# Patient Record
Sex: FEMALE | Race: OTHER | HISPANIC OR LATINO | Employment: UNEMPLOYED | URBAN - METROPOLITAN AREA
[De-identification: names, ages, dates, MRNs, and addresses within clinical notes are randomized per-mention and may not be internally consistent; named-entity substitution may affect disease eponyms.]

---

## 2018-11-26 ENCOUNTER — HOSPITAL ENCOUNTER (EMERGENCY)
Facility: HOSPITAL | Age: 38
Discharge: HOME | End: 2018-11-26
Attending: EMERGENCY MEDICINE | Admitting: EMERGENCY MEDICINE
Payer: COMMERCIAL

## 2018-11-26 VITALS
DIASTOLIC BLOOD PRESSURE: 82 MMHG | WEIGHT: 197 LBS | TEMPERATURE: 98.3 F | HEART RATE: 118 BPM | RESPIRATION RATE: 18 BRPM | SYSTOLIC BLOOD PRESSURE: 133 MMHG | OXYGEN SATURATION: 100 %

## 2018-11-26 DIAGNOSIS — T78.40XA ALLERGIC REACTION: Primary | ICD-10-CM

## 2018-11-26 PROCEDURE — 96375 TX/PRO/DX INJ NEW DRUG ADDON: CPT

## 2018-11-26 PROCEDURE — 96374 THER/PROPH/DIAG INJ IV PUSH: CPT

## 2018-11-26 PROCEDURE — 99283 EMERGENCY DEPT VISIT LOW MDM: CPT

## 2018-11-26 RX ORDER — RANITIDINE 150 MG/1
150 CAPSULE ORAL DAILY
Qty: 30 CAPSULE | Refills: 0 | Status: SHIPPED | OUTPATIENT
Start: 2018-11-26

## 2018-11-26 RX ORDER — FEXOFENADINE HYDROCHLORIDE 60 MG/1
60 TABLET, FILM COATED ORAL 2 TIMES DAILY
Qty: 20 TABLET | Refills: 0 | Status: SHIPPED | OUTPATIENT
Start: 2018-11-26

## 2018-11-26 RX ORDER — DIPHENHYDRAMINE HYDROCHLORIDE 50 MG/ML
50 INJECTION INTRAMUSCULAR; INTRAVENOUS ONCE
Status: COMPLETED | OUTPATIENT
Start: 2018-11-26 | End: 2018-11-26

## 2018-11-26 RX ORDER — METHYLPREDNISOLONE SODIUM SUCCINATE 125 MG/2ML
125 INJECTION, POWDER, LYOPHILIZED, FOR SOLUTION INTRAMUSCULAR; INTRAVENOUS ONCE
Status: COMPLETED | OUTPATIENT
Start: 2018-11-26 | End: 2018-11-26

## 2018-11-26 RX ORDER — METHYLPREDNISOLONE 4 MG/1
TABLET ORAL
Qty: 21 TABLET | Refills: 0 | Status: SHIPPED | OUTPATIENT
Start: 2018-11-26

## 2018-11-26 RX ADMIN — FAMOTIDINE 20 MG: 10 INJECTION, SOLUTION INTRAVENOUS at 14:26

## 2018-11-26 RX ADMIN — METHYLPREDNISOLONE SODIUM SUCCINATE 125 MG: 125 INJECTION, POWDER, FOR SOLUTION INTRAMUSCULAR; INTRAVENOUS at 14:26

## 2018-11-26 RX ADMIN — DIPHENHYDRAMINE HYDROCHLORIDE 50 MG: 50 INJECTION, SOLUTION INTRAMUSCULAR; INTRAVENOUS at 14:26

## 2018-11-26 NOTE — DISCHARGE INSTRUCTIONS
Reacción alérgica general   LO QUE NECESITA SABER:   Fernanda reacción alérgica es la respuesta de kapoor cuerpo a un alérgeno  Los alérgenos [employer], alimentos, picaduras de insectos, la caspa de los [place], el moho, el látex, los químicos y los ácaros del polvo  El polen de los [place], [place] césped y las hierbas malas también pueden causar fernanda reacción alérgica  INSTRUCCIONES SOBRE EL [facility]ITAL[facility]:   Regrese a la mitch de emergencias si:   · Usted tiene un sarpullido, urticaria, inflamación o comezón en la piel que empeora  · Usted tiene dificultad para respirar, falta de aire, sibilancia o tos  · Kapoor garganta se siente apretada o venkat labios o lengua se inflaman  · Usted tiene dificultad para tragar o hablar  · Usted tiene mareos, sensación de aturdimiento, desmayos o confusión  · Usted tiene náusea, vómito, diarrea o calambres abdominales  · Usted tiene dolor o presión en el pecho  Pregúntele a kapoor [place] vitaminas y minerales son adecuados para usted  · Usted tiene preguntas o inquietudes acerca de kapoor condición o cuidado  Medicamentos:   · Medicamentos,  para aliviar ciertos síntomas de las [place], drew la comezón, los estornudos y la inflamación  Usted los puede vahid en forma de píldora o de gotas en venkat ojos o nariz  Los tratamientos tópicos pueden darse para aplicarse directamente en kapoor piel para ayudar a disminuir la comezón o la inflamación  · [place] venkat medicamentos drew se le haya indicado  Consulte con kapoor médico si usted keturah que kapoor medicamento no le está ayudando o si presenta efectos secundarios  Infórmele si es alérgico a algún medicamento  Mantenga fernanda lista actualizada de los [place], las vitaminas y los productos herbales que lynsey  Incluya los siguientes datos de los medicamentos: cantidad, frecuencia y motivo de administración  Traiga con usted la lista o los envases de la píldoras a venkat citas de seguimiento   Lleve la lista de los medicamentos con usted en jennifer de fernanda emergencia  Acuda a venkat consultas de control con quintero médico según le indicaron  Anote venkat preguntas para que se acuerde de hacerlas carlos venkat visitas  Cuidados personales:   · Evite el alérgeno  que usted keturah haya causado quintero reacción alérgica  · Use compresas frías  en la piel o los ojos si fueron afectados por la reacción alérgica  Es probable que las compresas frías ayuden a aliviar quintero piel u ojos  · Enjuague venkat pasajes nasales  con fernanda solución salina  El enjuague diario puede llegar a ayudar a mantener limpia quintero nariz de alérgenos  · No fume  Los síntomas de venkat alergias pueden llegar a disminuir si usted no se expone al humo del cigarrillo  La nicotina y otros químicos de los cigarrillos y cigarros también pueden provocar daño en los pulmones  Pida información a quintero médico si usted actualmente fuma y necesita ayuda para dejar de fumar  Los cigarrillos electrónicos o tabaco sin humo todavía contienen nicotina  Consulte con quintero médico antes de [employer]  © 2017 [address]  Information is for End User's use only and may not be sold, redistributed or otherwise used for commercial purposes  All illustrations and images included in CareNotes® are the copyrighted property of A D A M , Inc  or Jorge Barrera  Esta información es sólo para uso en educación  Quintero intención no es darle un consejo médico sobre enfermedades o tratamientos  Colsulte con quintero Joiner Mukherjee farmacéutico antes de seguir cualquier régimen médico para saber si es seguro y efectivo para usted  Metilprednisolona (Por la boca)   Sirve para tratar muchas enfermedades y condiciones, incluyendo problemas relacionadas con la inflamación  Asimta medicamento es un corticosteroide  Anita(s) : Medrol, Medrol Dosepak   Existen muchas otras marcas de [facility] [employer]    Asmita medicamento no debe ser usado cuando:   Asmita medicamento no es adecuado para todas las personas  No lo use si ha tenido fernanda reacción alérgica a la metilprednisolona o si actualmente tiene fernanda infección por hongos  Forma de usar adebayo medicamento:   Tableta  · [place] venkat medicamentos drew se le haya indicado  Es probable que sea necesario cambiar kapoor dosis varias veces hasta encontrar la que funciona mejor para usted  · Es posible que tengan que cambiarle kapoor dosis ocasionalmente si está usando adebayo medicamento para enfermedades existentes  Algunas personas vicki adebayo medicamento un día por medio, lo que ayuda a [employer] secundarios  · [place] kapoor medicamento por la mañana a menos que el médico le indique lo contrario  · Lo más conveniente es vahid adebayo medicamento con comida o con [place]  · Si olvida fernanda dosis: Si olvida fernanda dosis de kapoor medicamento, tómelo lo más pronto posible  Si es shannon la hora para kapoor próxima dosis, espere hasta entonces para vahid kapoor dosis regular  No use medicamento adicional para reponer la dosis olvidada  · Guarde el medicamento en un recipiente cerrado a temperatura ambiente y alejado del calor, la humedad y la christianne directa  Medicamentos y [employer] que debe evitar:   Consulte con kapoor médico o farmacéutico antes de usar cualquier medicamento, incluyendo los que compra sin receta médica, las vitaminas y los productos herbales  · Algunos alimentos y medicamentos pueden afectar la eficacia de metilprednisolona  Informe a kapoor médico si usted [place] alguno de los siguientes medicamentos:  ¨ Ciclosporina  ¨ Lor, fenitoína  ¨ Rifampina  ¨ Ketoconazol  ¨ Aspirina, especialmente dosis altas  ¨ Un anticoagulante, drew la warfarina  ¨ Medicamento para la diabetes  · Adebayo medicamento podría interferir con las vacunas  Consulte a kapoor médico antes de recibir [employer] contra la gripe u otras vacunas    Precauciones carlos el uso de adebayo medicamento:   · Infórmele a kapoor médico si usted está embarazada o dando de lactar, o si tiene problemas renales, enfermedad hepática, insuficiencia cardíaca, hipertensión, osteoporosis, problemas de coagulación, o problemas de tiroides  También informe a kapoor médico si usted ha tenido [address] o emocionales (drew la depresión) o problemas estomacales o intestinales (drew Jaxon Ophelia o diverticulitis)  · Asmita medicamento puede provocarle los siguientes problemas:  ¨ Cambios de [place] o del [place] de ánimo  ¨ Presión arterial aumentada, retención de agua, cambio en los niveles de sal o potasio  ¨ Cataratas o glaucoma (con uso a endy plazo)  ¨ Crecimiento lento en niños (con el uso a endy plazo)  · No suspenda el uso de asmita medicamento súbitamente  Kapoor médico necesitará disminuir kapoor dosis poco a poco antes de suspender el medicamento por completo  · Asmita medicamento podría causar que usted contraiga infecciones con mayor facilidad  Infórmele a kapoor médico inmediatamente si usted tiene fernanda infección o si se expone a la varicela, sarampión, u otras infecciones serias  Informe a kapoor médico si anteriormente usted foster sufrido fernanda infección grave, drew tuberculosis, herpes, o estrongiloides (oxiurosis)  · Informe a kapoor médico acerca de cualquier estrés adicional, o lo que le provoca ansiedad en kapoor anita  Podría ser necesario cambiar kapoor dosis por un corto periodo de Sandi  · Dígale a todo médico o dentista encargado de atenderle que usted está usando Eugenia  · Guarde todos los medicamentos fuera del alcance de los niños  Nunca comparta venkat medicamentos con [employer]    Efectos secundarios que pueden presentarse carlos el uso de asmita medicamento:   Consulte inmediatamente con el médico si nota cualquiera de estos efectos secundarios:  · Reacción alérgica: Comezón o ronchas, hinchazón del kirk o las julita, hinchazón u hormigueo en la boca o garganta, opresión en el pecho, dificultad para respirar  · Pecas oscuras, cambio del color de la piel, frialdad, debilidad, cansancio, náusea, vómito, pérdida del peso corporal  · Depresión, pensamientos, sentimientos o comportamientos inusuales, dificultad para dormir  · Jose, escalofríos, tos, dolor de garganta, o el cuerpo adolorido  · Dolor intenso de [place], náuseas, vómitos o deposiciones de color carnes o tasha  · Cambios o crecimientos en la piel  · Inflamación en venkat julita, tobillos, o pies, aumento de peso rápido  · Problemas en la vista, dolor en los ojos, dolor de benja  Consulte con el médico si nota los siguientes efectos secundarios menos graves:   · Aumento del apetito  · [place] redonda, inflamada  · Aumento de peso alrededor de quintero deysi, parte superior de la espalda, pecho, malik o cintura  Consulte con el médico si nota otros efectos secundarios que keturah son causados por adebayo medicamento  Llame a quintero médico para consultarle Marylu Marti puede notificar venkat efectos secundarios al FDA al [id number]  © 2017 [address] Information is for End User's use only and may not be sold, redistributed or otherwise used for commercial purposes  Esta información es sólo para uso en educación  Quintero intención no es darle un consejo médico sobre enfermedades o tratamientos  Colsulte con quintero [place] farmacéutico antes de seguir cualquier régimen médico para saber si es seguro y efectivo para usted  Fexofenadina (Por la boca)   Trata los síntomas de la fiebre del heno y la urticaria crónica y la comezón  Anita(s) : Gertrude Ramirez Allergy, Allegra ODT, Allergy 24Hr, Children's Allegra Allergy, Children's Allergy Fexofenadine HCl, [facility] Allergy Relief, [facility] Fexofenadine HCl, Leader Aller-Ease, [facility] Allergy Relief, Sunmark Fexofenadine Hydrochloride, TopCare 24-Hour Fexofenadine Hydrochloride, WAL-FEX ALLERGY, Wal-Fex   Existen muchas otras marcas de [place]  Adebayo medicamento no debe ser usado cuando:   Adebayo medicamento no es adecuado para todas las personas   No lo use si ha tenido fernanda reacción alérgica a la fexofenadina o a la terfenadina  Forma de usar adebayo medicamento:   Addi Maher, Tableta para disolver, Tableta de liberación prolongada  · Quintero médico le indicara cuanto medicamento necesita usar  No use más medicamento de lo indicado  · Es mejor vahid la tableta  con agua  · Agite jorge alberto la solución oral  antes de usarla  Mida el líquido oral con [place], [place] para uso oral o taza especialmente marcadas para medir medicamentos  · [place] la tableta de desintegración oral con el estómago vacío  · Si usted está usando la tableta desintegrable, asegúrese de e secar venkat julita antes de tocar la tableta  Despegue hacia atrás el aluminio del paquete y saque la tableta  No empuje la tableta a través del aluminio  Coloque la tableta en quintero boca  [place]annon pronto la tableta se haya derretido, tome un poco de agua  No mastique ni rompa la tableta  · Si olvida fernanda dosis: Si olvida fernanda dosis de quintero medicamento, tómelo lo más pronto posible  Si es shannon la hora para quintero próxima dosis, espere hasta entonces para vahid quintero dosis regular  No use medicamento adicional para reponer la dosis olvidada  · Guarde el medicamento en un recipiente cerrado a temperatura ambiente y alejado del calor, la humedad y la christianne directa  Medicamentos y [employer] que debe evitar:   Consulte con quintero médico o farmacéutico antes de usar cualquier medicamento, incluyendo los que compra sin receta médica, las vitaminas y los productos herbales  · Algunos alimentos y [place] pueden afectar al funcionamiento de fexofenadina  Informe a u médico si usted [place] alguno de los siguientes medicamentos:   ¨ Eritromicina  ¨ Ketoconazol  · Si [place] un antiácido que contiene aluminio o magnesio, tómelo por lo menos 15 minutos antes o después de vahid fexofenadina  · No coma toronja ni tome jugos de [place], toronja o [address]    Precauciones carlos el uso de adebayo medicamento:   · Informe a quintero médico si usted está embarazada o amamantando o si padece de enfermedad en los riñones o fernanda enfermedad llamada fenilcetonuria (PKU)  · Asmita medicamento podría causarle a usted mareos o somnolencia  No maneje ni juan nada que pueda ser peligroso hasta que usted sepa drew le afecta asmita medicamento  · Llame a kapoor médico si venkat síntomas no mejoran, o si Earma Galea  · Guarde todos los medicamentos fuera del alcance de los niños  Nunca comparta venkat medicamentos con [employer]  Efectos secundarios que pueden presentarse carlos el uso de asmita medicamento:   Consulte inmediatamente con el médico si nota cualquiera de estos efectos secundarios:  · Reacción alérgica: Comezón o ronchas, hinchazón del kirk o las julita, hinchazón u hormigueo en la boca o garganta, opresión en el pecho, dificultad para respirar  Consulte con el médico si nota otros efectos secundarios que keturah son causados por asmita medicamento  Llame a kapoor médico para consultarle [place]  le puede notificar venkat efectos secundarios al FDA al [id number]  © 2017 [address]  Information is for End User's use only and may not be sold, redistributed or otherwise used for commercial purposes  Esta información es sólo para uso en educación  Kapoor intención no es darle un consejo médico sobre enfermedades o tratamientos  Colsulte con kapoor Parveen Keas farmacéutico antes de seguir cualquier régimen médico para saber si es seguro y efectivo para usted        Ranitidina (Por la boca)   Humphrey Sebastian y previene la acidez estomacal  También se [place] para tratar las úlceras estomacales, la enfermedad por reflujo gastroesofágico y las condiciones que causan demasiada producción de ácido estomacal    Anita(s) : DermaSilkRx Anodynexa [place], DermacinRx Inflammatral [place], [facility] Acid Control 150, [facility] HypeSpark, Good Sense Acid Reducer, Leader Acid Control, Leader raNITIdine HCl, [facility] Aid Acid Reducer, Sunmark Acid Reducer, TopCare Heartburn Relief 150, TopCare Heartburn Relief 75, Zantac, Zantac 150, Zantac 300, Zantac 75   Existen muchas otras marcas de Eugenia  Asmita medicamento no debe ser usado cuando:   Asmita medicamento no es adecuado para todas las personas  No lo use si ha tenido fernanda reacción alérgica a la ranitidina  [place] de usar asmita medicamento:   [facility] Erich farrar  · Kapoor médico le indicara cuanto medicamento necesita usar  No use más medicamento de lo indicado  · [address] medicamento si usted está usando asmita medicamento sin prescripción médica  · Mida el líquido oral con [place], [place] para uso oral o taza especialmente marcadas para medir medicamentos  · La tableta efervescente  no debe ser [place], tomada entera ni disuelta sobre kapoor lengua  La tableta Zantac 25 EFFERdose®  debe ser [employer] en cucharita (o más) de agua  No tome el líquido hasta que la tableta se haya disuelto completamente  · Si está usando asmita medicamento para prevenir las agruras, tómelo de 30 a 60 minutos antes de que usted coma o tome algún alimento o bebida que le cause las agruras  · Si olvida fernanda dosis: Si olvida fernanda dosis de kapoor medicamento, tómelo lo más pronto posible  Si es shannon la hora para kapoor próxima dosis, espere hasta entonces para vahid kapoor dosis regular  No use medicamento adicional para reponer la dosis olvidada  · Guarde el medicamento en un recipiente cerrado a temperatura ambiente y alejado del calor, la humedad y la christianne directa  Usted puede guardar la solución oral  en el refrigerador  Medicamentos y [employer] que debe evitar:   Consulte con kapoor médico o farmacéutico antes de usar cualquier medicamento, incluyendo los que compra sin receta médica, las vitaminas y los productos herbales  · Algunos medicamentos podrían afectar la función de la ranitidina   Infórmele a kapoor médico si usted [employer] los siguientes:   ¨ Atazanavir  ¨ Delavirdina  ¨ Gefitinib  ¨ Glipizida  ¨ Ketoconazol  ¨ Midazolam  ¨ Triazolam  ¨ Warfarina  Precauciones carlos el uso de asmita medicamento:   · Informe a kapoor médico si usted está embarazada o dando de lactar (amamantando), o si usted tiene enfermedad en los riñones o en el hígado, o si tiene antecedentes de [place]  · Asmita medicamento de van EFFERdose® en forma de tabletas, contiene fenilalanina  Si usted sufre de fenilcetonuria, hable con kapoor médico antes de que usted use AMX  · Dígale a todo médico o dentista encargado de atenderle que usted está usando AMX  Puede que asmita medicamento afecte algunos resultados de [email or web address]  · Guarde todos los medicamentos fuera del alcance de los niños  Nunca comparta venkat medicamentos con [employer]  Efectos secundarios que pueden presentarse carlos el uso de asmita medicamento:   Consulte inmediatamente con el médico si nota cualquiera de estos efectos secundarios:  · Reacción alérgica: Comezón o ronchas, hinchazón del kirk o las julita, hinchazón u hormigueo en la boca o garganta, opresión en el pecho, dificultad para respirar  · Ampollas, despellejamiento, sarpullido carnes en la piel  · Orina oscura o heces pálidas, náuseas, vómitos, falta de apetito, dolor estomacal, coloración amarillenta en la piel u ojos  · Ritmo cardíaco acelerado, lento o irregular  · Sangrados, moretones o debilidad inusuales  Consulte con el médico si nota los siguientes efectos secundarios menos graves:   · Estreñimiento o diarrea  · Dolor de benja  · Náuseas, vómitos o dolor de estómago leves  Consulte con el médico si nota otros efectos secundarios que keturah son causados por asmita medicamento  Llame a kapoor médico para consultarle Marylu Marti puede notificar venkat efectos secundarios al FDA al [id number]    © 2017 [address] Information is for End User's use only and may not be sold, redistributed or otherwise used for commercial purposes  Esta información es sólo para uso en educación  Kapoor intención no es darle un consejo médico sobre enfermedades o tratamientos  Colsulte con kapoor Pinky Antis farmacéutico antes de seguir cualquier régimen médico para saber si es seguro y efectivo para usted

## 2018-11-26 NOTE — ED PROVIDER NOTES
History  Chief Complaint   Patient presents with    Allergic Reaction     Pt Moldovan speaking for; to (do) [facility] #880288 used for translation  Pt reports generalized itching that started yesterday  Pt denies introduction of new foods, medication or personal care items  Pt reports " I think its from the white dust in my apartment " Pt denies difficultly swallowing or trouble breathing  Here with apparent allergic reaction  Started w/ itchy rash to head/neck a couple of days ago and has slowly continued down the whole body  Rash primarily around neck, axilla, under breasts, waist, groin and lower legs  No new soaps, lotions, detergents, no new foods, no otc/herbals meds, no new pets, furniture, carpeting  No recent illness, no f/c/s, no cough/congestion  Denies sob/sandoval, no n/v/d  C/o red, itchy rash, not painful, no drainage     concerned about an exposure to something in the apartment  Per pt and , apartment in very poor condition  Has roaches, rats and other pests they they use glue traps, but no powders, sprays or foggers   concerned about a white powdery substance that falls off the ceiling and walls  Has reportedly made many complaints to the landlord w/o any improvement  History provided by:  Patient and spouse   used: Yes Shirlene Fernandez #[id number])    Allergic Reaction   Presenting symptoms: itching and rash    Severity:  Moderate  Duration:  2 days  Prior allergic episodes:  No prior episodes  Context: not animal exposure, not chemicals, not food allergies, not insect bite/sting, not medications and not new detergents/soaps    Relieved by:  None tried  Worsened by:  Nothing  Ineffective treatments:  None tried      None       Past Medical History:   Diagnosis Date    Asthma        History reviewed  No pertinent surgical history  History reviewed  No pertinent family history  I have reviewed and agree with the history as documented      Social History Substance Use Topics    Smoking status: Never Smoker    Smokeless tobacco: Never Used    Alcohol use No        Review of Systems   HENT: Negative for congestion and sore throat  Eyes: Positive for itching  Respiratory: Negative for chest tightness and shortness of breath  Gastrointestinal: Negative for diarrhea, nausea and vomiting  Skin: Positive for itching and rash  All other systems reviewed and are negative  Physical Exam  Physical Exam   Constitutional: She appears well-developed and well-nourished  HENT:   Nose: Nose normal    Eyes: Conjunctivae are normal    Neck: Neck supple  Cardiovascular: Normal rate and regular rhythm  Pulmonary/Chest: Effort normal and breath sounds normal  No respiratory distress  She has no wheezes  She has no rales  Abdominal: She exhibits no distension  Musculoskeletal: She exhibits no deformity  Neurological: She is alert  Skin: Skin is warm  Rash noted  Rash is maculopapular  Psychiatric: She has a normal mood and affect  Nursing note and vitals reviewed        Vital Signs  ED Triage Vitals   Temperature Pulse Respirations Blood Pressure SpO2   11/26/18 1245 11/26/18 1245 11/26/18 1245 11/26/18 1245 11/26/18 1245   98 3 °F (36 8 °C) 98 16 139/74 100 %      Temp Source Heart Rate Source Patient Position - Orthostatic VS BP Location FiO2 (%)   11/26/18 1245 11/26/18 1245 11/26/18 1433 11/26/18 1245 --   Oral Monitor Lying Right arm       Pain Score       11/26/18 1245       No Pain           Vitals:    11/26/18 1245 11/26/18 1433   BP: 139/74 133/82   Pulse: 98 (!) 118   Patient Position - Orthostatic VS:  Lying       Visual Acuity      ED Medications  Medications   diphenhydrAMINE (BENADRYL) injection 50 mg (50 mg Intravenous Given 11/26/18 1426)   famotidine (PEPCID) injection 20 mg (20 mg Intravenous Given 11/26/18 1426)   methylPREDNISolone sodium succinate (Solu-MEDROL) injection 125 mg (125 mg Intravenous Given 11/26/18 1426) Diagnostic Studies  Results Reviewed     None                 No orders to display              Procedures  Procedures       Phone Contacts  ED Phone Contact    ED Course  ED Course as of Nov 27 1526 Mon Nov 26, 2018   1510 Rash almost faded/gone  Will d/c on medrol dose pack, non-sedating antihistamine and H2 blocker  F/u w/ pcm/allergist                                MDM  Number of Diagnoses or Management Options  Allergic reaction: new and does not require workup     Amount and/or Complexity of Data Reviewed  Obtain history from someone other than the patient: yes      CritCare Time    Disposition  Final diagnoses: Allergic reaction     Time reflects when diagnosis was documented in both MDM as applicable and the Disposition within this note     Time User Action Codes Description Comment    11/26/2018  3:10 PM Clarice Alston Add [[id number]] Allergic reaction       ED Disposition     ED Disposition Condition Comment    Discharge  Sharyn Boyer discharge to home/self care  Condition at discharge: Good        Follow-up Information     Follow up With Specialties Details Why Brian 8 Internal Medicine In 3 days If symptoms worsen [facility] [address]  [phone]            Discharge Medication List as of 11/26/2018  3:12 PM      START taking these medications    Details   fexofenadine (ALLEGRA) 60 MG tablet Take 1 tablet (60 mg total) by mouth 2 (two) times a day, Starting Mon 11/26/2018, Print      methylprednisolone (MEDROL) 4 mg tablet Use as directed on pack, Print      ranitidine (ZANTAC) 150 MG capsule Take 1 capsule (150 mg total) by mouth daily, Starting Mon 11/26/2018, Print           No discharge procedures on file      ED Provider  Electronically Signed by           Emma Worrell DO  11/27/18 1525